# Patient Record
(demographics unavailable — no encounter records)

---

## 2024-12-09 NOTE — ASSESSMENT
[FreeTextEntry1] : Mother reports recently worsened right nasal congestion.  Exam today shows thickened secretions bilaterally nasal cavity, right serous effusion without infection, left tympanic membrane intact but effusion or retraction.  Reviewed audiogram today which shows mild right conductive hearing loss normal hearing left ear.  Rx azithromycin, Zyrtec.  Follow-up 6 weeks, if effusion does not not clear in right ear consider right PE tube insertion given child's history of mastoiditis.

## 2024-12-09 NOTE — PROCEDURE
[FreeTextEntry3] : Procedure note: Nasal endoscopy  Description of Procedure:  Nasal endoscopy was performed because of recalcitrant symptoms of nasal obstruction and/or chronic rhinitis, and anterior anatomic abnormalities precluding visualization.  Using a flexible endoscope with sheath, the nasal mucosa, septum, turbinates, and ostiomeatal complex were examined.    Nasal mucosa findings:  the nasal mucosa was mildly edematous. Septum findings:  the septum showed no abnormalities. Nasopharynx findings:  the patient could not tolerate examination of the nasopharynx. Middle meatus findings:  the middle meatus had no abnormalities. Sinuses findings:  the paranasal sinuses had no abnormalities.  Procedure note:  Binocular microscopy  Inspection of the ears was performed under binocular microscopy because of need to accurately visualize otologic landmarks and potential pathologic conditions that would not otherwise be visible through simple otoscopic exam.

## 2025-02-03 NOTE — PROCEDURE
[FreeTextEntry3] : Procedure note:  Binocular microscopy  Feb 03, 2025   Inspection of the ears was performed under binocular microscopy because of need to accurately visualize otologic landmarks and potential pathologic conditions that would not otherwise be visible through simple otoscopic exam.

## 2025-02-03 NOTE — ASSESSMENT
[FreeTextEntry1] : Reports improved hearing and no pain or drainage since last visit.  Exam today shows intact bilateral tympanic membranes without effusion or retraction, vital facial function is normal.  I personally ordered and reviewed an audiogram for the patient's abnormal auditory perception, which shows normal hearing bilaterally with type A tympanograms bilaterally.  Word discrimination scores are excellent bilaterally.  May use Flonase/antihistamine as needed for worsening ETD.  I again discussed allergy testing.  Follow-up spring months to confirm no recurrence of ETD.